# Patient Record
Sex: MALE | Race: AMERICAN INDIAN OR ALASKA NATIVE
[De-identification: names, ages, dates, MRNs, and addresses within clinical notes are randomized per-mention and may not be internally consistent; named-entity substitution may affect disease eponyms.]

---

## 2021-03-05 ENCOUNTER — HOSPITAL ENCOUNTER (EMERGENCY)
Dept: HOSPITAL 5 - ED | Age: 81
Discharge: OTHER INSTITUTION | End: 2021-03-05
Payer: OTHER GOVERNMENT

## 2021-03-05 VITALS — SYSTOLIC BLOOD PRESSURE: 92 MMHG | DIASTOLIC BLOOD PRESSURE: 61 MMHG

## 2021-03-05 DIAGNOSIS — E87.1: ICD-10-CM

## 2021-03-05 DIAGNOSIS — R74.01: ICD-10-CM

## 2021-03-05 DIAGNOSIS — A41.9: Primary | ICD-10-CM

## 2021-03-05 DIAGNOSIS — N17.9: ICD-10-CM

## 2021-03-05 DIAGNOSIS — M86.9: ICD-10-CM

## 2021-03-05 DIAGNOSIS — I10: ICD-10-CM

## 2021-03-05 DIAGNOSIS — E11.9: ICD-10-CM

## 2021-03-05 DIAGNOSIS — E87.2: ICD-10-CM

## 2021-03-05 DIAGNOSIS — R77.8: ICD-10-CM

## 2021-03-05 LAB
ALBUMIN SERPL-MCNC: 2.3 G/DL (ref 3.9–5)
ALT SERPL-CCNC: 227 UNITS/L (ref 7–56)
APTT BLD: 34.6 SEC. (ref 24.2–36.6)
BAND NEUTROPHILS # (MANUAL): 0 K/MM3
BILIRUB UR QL STRIP: (no result)
BLOOD UR QL VISUAL: (no result)
BUN SERPL-MCNC: 126 MG/DL (ref 9–20)
BUN/CREAT SERPL: 19 %
BURR CELLS BLD QL SMEAR: (no result)
CALCIUM SERPL-MCNC: 7 MG/DL (ref 8.4–10.2)
HCT VFR BLD CALC: 38.5 % (ref 35.5–45.6)
HDLC SERPL-MCNC: 22 MG/DL (ref 40–59)
HEMOLYSIS INDEX: 5
HGB BLD-MCNC: 12.7 GM/DL (ref 11.8–15.2)
HYALINE CASTS #/AREA URNS LPF: 1 /LPF
INR PPP: 1.49 (ref 0.87–1.13)
MCHC RBC AUTO-ENTMCNC: 33 % (ref 32–34)
MCV RBC AUTO: 82 FL (ref 84–94)
MYELOCYTES # (MANUAL): 0 K/MM3
PH UR STRIP: 5 [PH] (ref 5–7)
PLATELET # BLD: 74 K/MM3 (ref 140–440)
PROMYELOCYTES # (MANUAL): 0 K/MM3
RBC # BLD AUTO: 4.7 M/MM3 (ref 3.65–5.03)
RBC #/AREA URNS HPF: 2 /HPF (ref 0–6)
TOTAL CELLS COUNTED BLD: 100
UROBILINOGEN UR-MCNC: 2 MG/DL (ref ?–2)
WBC #/AREA URNS HPF: 3 /HPF (ref 0–6)

## 2021-03-05 PROCEDURE — 36415 COLL VENOUS BLD VENIPUNCTURE: CPT

## 2021-03-05 PROCEDURE — 96361 HYDRATE IV INFUSION ADD-ON: CPT

## 2021-03-05 PROCEDURE — 81001 URINALYSIS AUTO W/SCOPE: CPT

## 2021-03-05 PROCEDURE — 96365 THER/PROPH/DIAG IV INF INIT: CPT

## 2021-03-05 PROCEDURE — 80307 DRUG TEST PRSMV CHEM ANLYZR: CPT

## 2021-03-05 PROCEDURE — 93005 ELECTROCARDIOGRAM TRACING: CPT

## 2021-03-05 PROCEDURE — 96367 TX/PROPH/DG ADDL SEQ IV INF: CPT

## 2021-03-05 PROCEDURE — 84443 ASSAY THYROID STIM HORMONE: CPT

## 2021-03-05 PROCEDURE — 87040 BLOOD CULTURE FOR BACTERIA: CPT

## 2021-03-05 PROCEDURE — 82550 ASSAY OF CK (CPK): CPT

## 2021-03-05 PROCEDURE — 80061 LIPID PANEL: CPT

## 2021-03-05 PROCEDURE — 85730 THROMBOPLASTIN TIME PARTIAL: CPT

## 2021-03-05 PROCEDURE — 70450 CT HEAD/BRAIN W/O DYE: CPT

## 2021-03-05 PROCEDURE — 71045 X-RAY EXAM CHEST 1 VIEW: CPT

## 2021-03-05 PROCEDURE — 99291 CRITICAL CARE FIRST HOUR: CPT

## 2021-03-05 PROCEDURE — 96376 TX/PRO/DX INJ SAME DRUG ADON: CPT

## 2021-03-05 PROCEDURE — 85610 PROTHROMBIN TIME: CPT

## 2021-03-05 PROCEDURE — 82805 BLOOD GASES W/O2 SATURATION: CPT

## 2021-03-05 PROCEDURE — 84484 ASSAY OF TROPONIN QUANT: CPT

## 2021-03-05 PROCEDURE — 82962 GLUCOSE BLOOD TEST: CPT

## 2021-03-05 PROCEDURE — 85007 BL SMEAR W/DIFF WBC COUNT: CPT

## 2021-03-05 PROCEDURE — G0480 DRUG TEST DEF 1-7 CLASSES: HCPCS

## 2021-03-05 PROCEDURE — 96375 TX/PRO/DX INJ NEW DRUG ADDON: CPT

## 2021-03-05 PROCEDURE — 73620 X-RAY EXAM OF FOOT: CPT

## 2021-03-05 PROCEDURE — 85025 COMPLETE CBC W/AUTO DIFF WBC: CPT

## 2021-03-05 PROCEDURE — 80053 COMPREHEN METABOLIC PANEL: CPT

## 2021-03-05 PROCEDURE — 87186 SC STD MICRODIL/AGAR DIL: CPT

## 2021-03-05 PROCEDURE — 72125 CT NECK SPINE W/O DYE: CPT

## 2021-03-05 PROCEDURE — 87086 URINE CULTURE/COLONY COUNT: CPT

## 2021-03-05 PROCEDURE — 80320 DRUG SCREEN QUANTALCOHOLS: CPT

## 2021-03-05 PROCEDURE — 87076 CULTURE ANAEROBE IDENT EACH: CPT

## 2021-03-05 PROCEDURE — 82140 ASSAY OF AMMONIA: CPT

## 2021-03-05 NOTE — CAT SCAN REPORT
CT cervical spine wo con, CT head/brain wo con



INDICATION:

AMS, fall.



TECHNIQUE: CT head and cervical spine without contrast. All CT scans at this location are performed u
sing CT dose reduction for ALARA by means of automated exposure control.



COMPARISON: 

None.



FINDINGS:



HEAD:



Intracranial: Gray-white matter differentiation is maintained. No intracranial hemorrhage. No extra a
xial collection.. No hydrocephalus. No herniation.



Sinuses: Paranasal sinuses and mastoid air cells are essentially clear.



Orbits: Globes are intact



Calvarium: No acute fracture.





CERVICAL:



Alignment: 2 to 3 mm degenerative retrolisthesis from C2-C3 to C5-C6. 



Vertebrae: No fracture. Vertebral body heights are preserved. C1 and C2 are congruent.  Atlantooccipi
bobbi joint is maintained.



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  Facet arthropath
y with multilevel severe osseous foraminal narrowing.



Soft tissues: No prevertebral soft tissue thickening.



Additional findings: No significant additional findings.



IMPRESSION:

1.  No acute intracranial abnormality.

2.No cervical spine fracture.



 



Signer Name: Ady Brush MD 

Signed: 3/5/2021 12:46 PM

Workstation Name: [id number]

## 2021-03-05 NOTE — EMERGENCY DEPARTMENT REPORT
HPI





- General


Time Seen by Provider: 03/05/21 09:57





- HPI


HPI: 





This is an 80-year-old -American male who presents to the emergency 

department via EMS from home after a welfare check was done and the patient was 

found to be prone on the floor covered in his own urine.  The patient has a 

history of hypertension, remote prostate cancer, and he had recent left foot 

surgery done by a podiatrist about 1 week ago.  The patient was last seen 

normal/well sometime Wednesday, by a neighbor.  The patient's daughter is 

currently at bedside and says that she also spoke to him on Wednesday.  The 

patient lives alone, but normally is able to take care of all of his ADLs.  The 

patient is sleepy, but easily arousable.  He is oriented to person, place, time,

AAO x3, but still is a poor historian.  He is currently incontinent of urine and

appears to have some left-sided weakness.  The patient was found to have a blood

sugar of 47 with EMS.





ED Review of Systems


ROS: 


Stated complaint: ALTERED MENTAL STATUS


Other details as noted in HPI





Comment: Unobtainable due to pts medical conditions


Constitutional: weakness


Musculoskeletal: arthralgia (Left foot)


Neurological: weakness





Physical Exam





- Physical Exam


Physical Exam: 





GENERAL: Disheveled appearance.  Patient is ill-appearing.


HENT: Normocephalic.  Atraumatic.    Patient has dry mucous membranes.


EYES: Extraocular motions are intact.  Pupils equal reactive to light 

bilaterally.


NECK: Supple. Trachea is midline.


CHEST/LUNGS: Clear to auscultation.  There is no respiratory distress noted.


HEART/CARDIOVASCULAR: Regular.  There is moderate tachycardia.  There is no 

murmur.


ABDOMEN: Abdomen is soft, nontender.  Patient has normal bowel sounds.  There is

no abdominal distention.


SKIN: Skin is warm and dry.  The left foot is wet and macerated.  There is an 

ulcerating wound to the left heel.


NEURO: The patient is awake, alert, and oriented.  The patient is cooperative.  

There is some slurred speech.  Left-sided weakness when compared to right.


MUSCULOSKELETAL: There is no tenderness to palpation.





ED Course





- Reevaluation(s)


Reevaluation #1: 





03/05/21 10:38


The patient is an 8 on the NIH stroke scale.  Last known well time was sometime 

Wednesday, at least 36 hours ago.  Therefore the patient is not a candidate for 

TPA or thrombectomy.





- Consultations


Consultation #1: 


I spoke with the patient's podiatrist, Dr. Alicia Bain.  He says that he is 

able and willing to consult on this patient if he gets admitted to [facility].  The transfer hub is paging the hospitalist service.


03/05/21 13:41





Consultation #2: 





03/05/21 15:05


I have spoken to the hospitalist, Dr. Ordoñez, regarding the patient's presentation 

and ED course.  She felt the patient would be a more appropriately taken care of

in the MICU.  I spoke to the intensivist, Dr. Rios, who has accepted the 

patient for transfer to the [facility].  He has requested that the patient 

receive clindamycin and an antibiotic for anaerobic coverage.





- ABG Interpretation


Ph: 7.349


PCO2: 17


PO2: 122


Bicarbonate: 9.5


Interpretation: respiratory alkalosis, metabolic acidosis





ED Medical Decision Making





- Lab Data


Result diagrams: 


                                 03/05/21 10:57





                                 03/05/21 10:57





                                   Lab Results











  03/05/21 03/05/21 03/05/21 Range/Units





  10:16 10:40 10:57 


 


WBC    23.7 H  (4.5-11.0)  K/mm3


 


RBC    4.70  (3.65-5.03)  M/mm3


 


Hgb    12.7  (11.8-15.2)  gm/dl


 


Hct    38.5  (35.5-45.6)  %


 


MCV    82 L  (84-94)  fl


 


MCH    27 L  (28-32)  pg


 


MCHC    33  (32-34)  %


 


RDW    17.2 H  (13.2-15.2)  %


 


Plt Count    74 L  (140-440)  K/mm3


 


Add Manual Diff    Complete  


 


Total Counted    100  


 


Seg Neuts % (Manual)    90.0 H  (40.0-70.0)  %


 


Lymphocytes % (Manual)    8.0 L  (13.4-35.0)  %


 


Monocytes % (Manual)    2.0  (0.0-7.3)  %


 


Nucleated RBC %    Not Reportable  


 


Seg Neutrophils # Man    21.3 H  (1.8-7.7)  K/mm3


 


Band Neutrophils #    0.0  K/mm3


 


Lymphocytes # (Manual)    1.9  (1.2-5.4)  K/mm3


 


Abs React Lymphs (Man)    0.0  K/mm3


 


Monocytes # (Manual)    0.5  (0.0-0.8)  K/mm3


 


Eosinophils # (Manual)    0.0  (0.0-0.4)  K/mm3


 


Basophils # (Manual)    0.0  (0.0-0.1)  K/mm3


 


Metamyelocytes #    0.0  K/mm3


 


Myelocytes #    0.0  K/mm3


 


Promyelocytes #    0.0  K/mm3


 


Blast Cells #    0.0  K/mm3


 


WBC Morphology    Not Reportable  


 


Hypersegmented Neuts    Not Reportable  


 


Hyposegmented Neuts    Not Reportable  


 


Hypogranular Neuts    Not Reportable  


 


Smudge Cells    Not Reportable  


 


Toxic Granulation    Not Reportable  


 


Toxic Vacuolation    Not Reportable  


 


Dohle Bodies    Not Reportable  


 


Pelger-Huet Anomaly    Not Reportable  


 


Devno Rods    Not Reportable  


 


Platelet Estimate    Consistent w auto  


 


Clumped Platelets    Not Reportable  


 


Plt Clumps, EDTA    Not Reportable  


 


Large Platelets    Not Reportable  


 


Giant Platelets    Not Reportable  


 


Platelet Satelliting    Not Reportable  


 


Plt Morphology Comment    Not Reportable  


 


RBC Morphology    Not Reportable  


 


Dimorphic RBCs    Not Reportable  


 


Polychromasia    Not Reportable  


 


Hypochromasia    Not Reportable  


 


Poikilocytosis    Not Reportable  


 


Anisocytosis    Not Reportable  


 


Microcytosis    Not Reportable  


 


Macrocytosis    Not Reportable  


 


Spherocytes    Not Reportable  


 


Pappenheimer Bodies    Not Reportable  


 


Sickle Cells    Not Reportable  


 


Target Cells    Not Reportable  


 


Tear Drop Cells    Not Reportable  


 


Ovalocytes    Not Reportable  


 


Helmet Cells    Not Reportable  


 


[place] Bodies    Not Reportable  


 


Cabot Rings    Not Reportable  


 


[place] Cells    1+  


 


Bite Cells    Not Reportable  


 


Crenated Cell    Not Reportable  


 


Elliptocytes    Not Reportable  


 


Acanthocytes (Spur)    Rare  


 


Rouleaux    Not Reportable  


 


Hemoglobin C Crystals    Not Reportable  


 


Schistocytes    Rare  


 


Malaria parasites    Not Reportable  


 


Gerson Bodies    Not Reportable  


 


Hem Pathologist Commnt    No  


 


PT     (12.2-14.9)  Sec.


 


INR     (0.87-1.13)  


 


APTT     (24.2-36.6)  Sec.


 


ABG pH     (7.320-7.450)  


 


POC ABG pCO2     (32.0-48.0)  mmHg


 


POC ABG pO2     ()  mmHg


 


POC ABG HCO3     


 


POC ABG Base Excess     


 


ABG Hemoglobin     (12.0-17.5)  


 


ABG Oxyhemoglobin     (94-98)  


 


ABG Methemoglobin     (0.0-1.5)  


 


ABG Sodium     (136.0-145.0)  mmol/L


 


ABG Potassium     (3.40-4.50)  mmol/L


 


ABG Chloride     ()  mmol/L


 


ABG Glucose     (65-95)  mg/dL


 


Carboxyhemoglobin     (0.5-1.5)  


 


FiO2     


 


Sodium     (137-145)  mmol/L


 


Potassium     (3.6-5.0)  mmol/L


 


Chloride     ()  mmol/L


 


Carbon Dioxide     (22-30)  mmol/L


 


Anion Gap     mmol/L


 


BUN     (9-20)  mg/dL


 


Creatinine     (0.8-1.3)  mg/dL


 


Estimated GFR     ml/min


 


BUN/Creatinine Ratio     %


 


Glucose     ()  mg/dL


 


POC Glucose  37 L  < 10 L   ()  mg/dL


 


Lactic Acid     (0.7-2.0)  mmol/L


 


Calcium     (8.4-10.2)  mg/dL


 


Total Bilirubin     (0.1-1.2)  mg/dL


 


AST     (5-40)  units/L


 


ALT     (7-56)  units/L


 


Alkaline Phosphatase     ()  units/L


 


Ammonia     (25-60)  umol/L


 


Total Creatine Kinase     ()  units/L


 


Troponin T     (0.00-0.029)  ng/mL


 


Total Protein     (6.3-8.2)  g/dL


 


Albumin     (3.9-5)  g/dL


 


Albumin/Globulin Ratio     %


 


Triglycerides     (2-149)  mg/dL


 


Cholesterol     ()  mg/dL


 


LDL Cholesterol Direct     ()  mg/dL


 


HDL Cholesterol     (40-59)  mg/dL


 


Cholesterol/HDL Ratio     %


 


TSH     (0.270-4.200)  mlU/mL


 


Arterial Blood Glucose     (65-95)  mg/dL


 


Arterial Blood Ionized Calcium     (4.6-5.3)  mg/dL


 


Urine Color     (Yellow)  


 


Urine Turbidity     (Clear)  


 


Urine pH     (5.0-7.0)  


 


Ur Specific Gravity     (1.003-1.030)  


 


Urine Protein     (Negative)  mg/dL


 


Urine Glucose (UA)     (Negative)  mg/dL


 


Urine Ketones     (Negative)  mg/dL


 


Urine Blood     (Negative)  


 


Urine Nitrite     (Negative)  


 


Urine Bilirubin     (Negative)  


 


Urine Urobilinogen     (<2.0)  mg/dL


 


Ur Leukocyte Esterase     (Negative)  


 


Urine WBC (Auto)     (0.0-6.0)  /HPF


 


Urine RBC (Auto)     (0.0-6.0)  /HPF


 


Hyaline Casts     /LPF


 


Urine Opiates Screen     


 


Urine Methadone Screen     


 


Ur Barbiturates Screen     


 


Ur Phencyclidine Scrn     


 


Ur Amphetamines Screen     


 


U Benzodiazepines Scrn     


 


Urine Cocaine Screen     


 


U Marijuana (THC) Screen     


 


Drugs of Abuse Note     


 


Plasma/Serum Alcohol     (0-0.07)  %














  03/05/21 03/05/21 03/05/21 Range/Units





  10:57 10:57 10:57 


 


WBC     (4.5-11.0)  K/mm3


 


RBC     (3.65-5.03)  M/mm3


 


Hgb     (11.8-15.2)  gm/dl


 


Hct     (35.5-45.6)  %


 


MCV     (84-94)  fl


 


MCH     (28-32)  pg


 


MCHC     (32-34)  %


 


RDW     (13.2-15.2)  %


 


Plt Count     (140-440)  K/mm3


 


Add Manual Diff     


 


Total Counted     


 


Seg Neuts % (Manual)     (40.0-70.0)  %


 


Lymphocytes % (Manual)     (13.4-35.0)  %


 


Monocytes % (Manual)     (0.0-7.3)  %


 


Nucleated RBC %     


 


Seg Neutrophils # Man     (1.8-7.7)  K/mm3


 


Band Neutrophils #     K/mm3


 


Lymphocytes # (Manual)     (1.2-5.4)  K/mm3


 


Abs React Lymphs (Man)     K/mm3


 


Monocytes # (Manual)     (0.0-0.8)  K/mm3


 


Eosinophils # (Manual)     (0.0-0.4)  K/mm3


 


Basophils # (Manual)     (0.0-0.1)  K/mm3


 


Metamyelocytes #     K/mm3


 


Myelocytes #     K/mm3


 


Promyelocytes #     K/mm3


 


Blast Cells #     K/mm3


 


WBC Morphology     


 


Hypersegmented Neuts     


 


Hyposegmented Neuts     


 


Hypogranular Neuts     


 


Smudge Cells     


 


Toxic Granulation     


 


Toxic Vacuolation     


 


Dohle Bodies     


 


Pelger-Huet Anomaly     


 


Devon Rods     


 


Platelet Estimate     


 


Clumped Platelets     


 


Plt Clumps, EDTA     


 


Large Platelets     


 


Giant Platelets     


 


Platelet Satelliting     


 


Plt Morphology Comment     


 


RBC Morphology     


 


Dimorphic RBCs     


 


Polychromasia     


 


Hypochromasia     


 


Poikilocytosis     


 


Anisocytosis     


 


Microcytosis     


 


Macrocytosis     


 


Spherocytes     


 


Pappenheimer Bodies     


 


Sickle Cells     


 


Target Cells     


 


Tear Drop Cells     


 


Ovalocytes     


 


Helmet Cells     


 


[place] Bodies     


 


Cabot Rings     


 


[place] Cells     


 


Bite Cells     


 


Crenated Cell     


 


Elliptocytes     


 


Acanthocytes (Spur)     


 


Rouleaux     


 


Hemoglobin C Crystals     


 


Schistocytes     


 


Malaria parasites     


 


Gerson Bodies     


 


Hem Pathologist Commnt     


 


PT  17.9 H    (12.2-14.9)  Sec.


 


INR  1.49 H    (0.87-1.13)  


 


APTT  34.6    (24.2-36.6)  Sec.


 


ABG pH     (7.320-7.450)  


 


POC ABG pCO2     (32.0-48.0)  mmHg


 


POC ABG pO2     ()  mmHg


 


POC ABG HCO3     


 


POC ABG Base Excess     


 


ABG Hemoglobin     (12.0-17.5)  


 


ABG Oxyhemoglobin     (94-98)  


 


ABG Methemoglobin     (0.0-1.5)  


 


ABG Sodium     (136.0-145.0)  mmol/L


 


ABG Potassium     (3.40-4.50)  mmol/L


 


ABG Chloride     ()  mmol/L


 


ABG Glucose     (65-95)  mg/dL


 


Carboxyhemoglobin     (0.5-1.5)  


 


FiO2     


 


Sodium   127 L   (137-145)  mmol/L


 


Potassium   4.5   (3.6-5.0)  mmol/L


 


Chloride   88.1 L   ()  mmol/L


 


Carbon Dioxide   12 L   (22-30)  mmol/L


 


Anion Gap   31   mmol/L


 


BUN   126 H   (9-20)  mg/dL


 


Creatinine   6.5 H   (0.8-1.3)  mg/dL


 


Estimated GFR   10   ml/min


 


BUN/Creatinine Ratio   19   %


 


Glucose   389 H   ()  mg/dL


 


POC Glucose     ()  mg/dL


 


Lactic Acid    3.70 H*  (0.7-2.0)  mmol/L


 


Calcium   7.0 L   (8.4-10.2)  mg/dL


 


Total Bilirubin   1.90 H   (0.1-1.2)  mg/dL


 


AST   355 H   (5-40)  units/L


 


ALT   227 H   (7-56)  units/L


 


Alkaline Phosphatase   121   ()  units/L


 


Ammonia     (25-60)  umol/L


 


Total Creatine Kinase     ()  units/L


 


Troponin T   0.145 H*   (0.00-0.029)  ng/mL


 


Total Protein   5.1 L   (6.3-8.2)  g/dL


 


Albumin   2.3 L   (3.9-5)  g/dL


 


Albumin/Globulin Ratio   0.8   %


 


Triglycerides   72   (2-149)  mg/dL


 


Cholesterol   58   ()  mg/dL


 


LDL Cholesterol Direct   10 L   ()  mg/dL


 


HDL Cholesterol   22 L   (40-59)  mg/dL


 


Cholesterol/HDL Ratio   2.63   %


 


TSH     (0.270-4.200)  mlU/mL


 


Arterial Blood Glucose     (65-95)  mg/dL


 


Arterial Blood Ionized Calcium     (4.6-5.3)  mg/dL


 


Urine Color     (Yellow)  


 


Urine Turbidity     (Clear)  


 


Urine pH     (5.0-7.0)  


 


Ur Specific Gravity     (1.003-1.030)  


 


Urine Protein     (Negative)  mg/dL


 


Urine Glucose (UA)     (Negative)  mg/dL


 


Urine Ketones     (Negative)  mg/dL


 


Urine Blood     (Negative)  


 


Urine Nitrite     (Negative)  


 


Urine Bilirubin     (Negative)  


 


Urine Urobilinogen     (<2.0)  mg/dL


 


Ur Leukocyte Esterase     (Negative)  


 


Urine WBC (Auto)     (0.0-6.0)  /HPF


 


Urine RBC (Auto)     (0.0-6.0)  /HPF


 


Hyaline Casts     /LPF


 


Urine Opiates Screen     


 


Urine Methadone Screen     


 


Ur Barbiturates Screen     


 


Ur Phencyclidine Scrn     


 


Ur Amphetamines Screen     


 


U Benzodiazepines Scrn     


 


Urine Cocaine Screen     


 


U Marijuana (THC) Screen     


 


Drugs of Abuse Note     


 


Plasma/Serum Alcohol     (0-0.07)  %














  03/05/21 03/05/21 03/05/21 Range/Units





  10:57 10:57 10:57 


 


WBC     (4.5-11.0)  K/mm3


 


RBC     (3.65-5.03)  M/mm3


 


Hgb     (11.8-15.2)  gm/dl


 


Hct     (35.5-45.6)  %


 


MCV     (84-94)  fl


 


MCH     (28-32)  pg


 


MCHC     (32-34)  %


 


RDW     (13.2-15.2)  %


 


Plt Count     (140-440)  K/mm3


 


Add Manual Diff     


 


Total Counted     


 


Seg Neuts % (Manual)     (40.0-70.0)  %


 


Lymphocytes % (Manual)     (13.4-35.0)  %


 


Monocytes % (Manual)     (0.0-7.3)  %


 


Nucleated RBC %     


 


Seg Neutrophils # Man     (1.8-7.7)  K/mm3


 


Band Neutrophils #     K/mm3


 


Lymphocytes # (Manual)     (1.2-5.4)  K/mm3


 


Abs React Lymphs (Man)     K/mm3


 


Monocytes # (Manual)     (0.0-0.8)  K/mm3


 


Eosinophils # (Manual)     (0.0-0.4)  K/mm3


 


Basophils # (Manual)     (0.0-0.1)  K/mm3


 


Metamyelocytes #     K/mm3


 


Myelocytes #     K/mm3


 


Promyelocytes #     K/mm3


 


Blast Cells #     K/mm3


 


WBC Morphology     


 


Hypersegmented Neuts     


 


Hyposegmented Neuts     


 


Hypogranular Neuts     


 


Smudge Cells     


 


Toxic Granulation     


 


Toxic Vacuolation     


 


Dohle Bodies     


 


Pelger-Huet Anomaly     


 


Devon Rods     


 


Platelet Estimate     


 


Clumped Platelets     


 


Plt Clumps, EDTA     


 


Large Platelets     


 


Giant Platelets     


 


Platelet Satelliting     


 


Plt Morphology Comment     


 


RBC Morphology     


 


Dimorphic RBCs     


 


Polychromasia     


 


Hypochromasia     


 


Poikilocytosis     


 


Anisocytosis     


 


Microcytosis     


 


Macrocytosis     


 


Spherocytes     


 


Pappenheimer Bodies     


 


Sickle Cells     


 


Target Cells     


 


Tear Drop Cells     


 


Ovalocytes     


 


Helmet Cells     


 


[place] Bodies     


 


Cabot Rings     


 


[place] Cells     


 


Bite Cells     


 


Crenated Cell     


 


Elliptocytes     


 


Acanthocytes (Spur)     


 


Rouleaux     


 


Hemoglobin C Crystals     


 


Schistocytes     


 


Malaria parasites     


 


Gerson Bodies     


 


Hem Pathologist Commnt     


 


PT     (12.2-14.9)  Sec.


 


INR     (0.87-1.13)  


 


APTT     (24.2-36.6)  Sec.


 


ABG pH     (7.320-7.450)  


 


POC ABG pCO2     (32.0-48.0)  mmHg


 


POC ABG pO2     ()  mmHg


 


POC ABG HCO3     


 


POC ABG Base Excess     


 


ABG Hemoglobin     (12.0-17.5)  


 


ABG Oxyhemoglobin     (94-98)  


 


ABG Methemoglobin     (0.0-1.5)  


 


ABG Sodium     (136.0-145.0)  mmol/L


 


ABG Potassium     (3.40-4.50)  mmol/L


 


ABG Chloride     ()  mmol/L


 


ABG Glucose     (65-95)  mg/dL


 


Carboxyhemoglobin     (0.5-1.5)  


 


FiO2     


 


Sodium     (137-145)  mmol/L


 


Potassium     (3.6-5.0)  mmol/L


 


Chloride     ()  mmol/L


 


Carbon Dioxide     (22-30)  mmol/L


 


Anion Gap     mmol/L


 


BUN     (9-20)  mg/dL


 


Creatinine     (0.8-1.3)  mg/dL


 


Estimated GFR     ml/min


 


BUN/Creatinine Ratio     %


 


Glucose     ()  mg/dL


 


POC Glucose     ()  mg/dL


 


Lactic Acid     (0.7-2.0)  mmol/L


 


Calcium     (8.4-10.2)  mg/dL


 


Total Bilirubin     (0.1-1.2)  mg/dL


 


AST     (5-40)  units/L


 


ALT     (7-56)  units/L


 


Alkaline Phosphatase     ()  units/L


 


Ammonia   23.0 L   (25-60)  umol/L


 


Total Creatine Kinase    3468 H  ()  units/L


 


Troponin T     (0.00-0.029)  ng/mL


 


Total Protein     (6.3-8.2)  g/dL


 


Albumin     (3.9-5)  g/dL


 


Albumin/Globulin Ratio     %


 


Triglycerides     (2-149)  mg/dL


 


Cholesterol     ()  mg/dL


 


LDL Cholesterol Direct     ()  mg/dL


 


HDL Cholesterol     (40-59)  mg/dL


 


Cholesterol/HDL Ratio     %


 


TSH     (0.270-4.200)  mlU/mL


 


Arterial Blood Glucose     (65-95)  mg/dL


 


Arterial Blood Ionized Calcium     (4.6-5.3)  mg/dL


 


Urine Color     (Yellow)  


 


Urine Turbidity     (Clear)  


 


Urine pH     (5.0-7.0)  


 


Ur Specific Gravity     (1.003-1.030)  


 


Urine Protein     (Negative)  mg/dL


 


Urine Glucose (UA)     (Negative)  mg/dL


 


Urine Ketones     (Negative)  mg/dL


 


Urine Blood     (Negative)  


 


Urine Nitrite     (Negative)  


 


Urine Bilirubin     (Negative)  


 


Urine Urobilinogen     (<2.0)  mg/dL


 


Ur Leukocyte Esterase     (Negative)  


 


Urine WBC (Auto)     (0.0-6.0)  /HPF


 


Urine RBC (Auto)     (0.0-6.0)  /HPF


 


Hyaline Casts     /LPF


 


Urine Opiates Screen     


 


Urine Methadone Screen     


 


Ur Barbiturates Screen     


 


Ur Phencyclidine Scrn     


 


Ur Amphetamines Screen     


 


U Benzodiazepines Scrn     


 


Urine Cocaine Screen     


 


U Marijuana (THC) Screen     


 


Drugs of Abuse Note     


 


Plasma/Serum Alcohol  < 0.01    (0-0.07)  %














  03/05/21 03/05/21 03/05/21 Range/Units





  10:57 10:58 11:21 


 


WBC     (4.5-11.0)  K/mm3


 


RBC     (3.65-5.03)  M/mm3


 


Hgb     (11.8-15.2)  gm/dl


 


Hct     (35.5-45.6)  %


 


MCV     (84-94)  fl


 


MCH     (28-32)  pg


 


MCHC     (32-34)  %


 


RDW     (13.2-15.2)  %


 


Plt Count     (140-440)  K/mm3


 


Add Manual Diff     


 


Total Counted     


 


Seg Neuts % (Manual)     (40.0-70.0)  %


 


Lymphocytes % (Manual)     (13.4-35.0)  %


 


Monocytes % (Manual)     (0.0-7.3)  %


 


Nucleated RBC %     


 


Seg Neutrophils # Man     (1.8-7.7)  K/mm3


 


Band Neutrophils #     K/mm3


 


Lymphocytes # (Manual)     (1.2-5.4)  K/mm3


 


Abs React Lymphs (Man)     K/mm3


 


Monocytes # (Manual)     (0.0-0.8)  K/mm3


 


Eosinophils # (Manual)     (0.0-0.4)  K/mm3


 


Basophils # (Manual)     (0.0-0.1)  K/mm3


 


Metamyelocytes #     K/mm3


 


Myelocytes #     K/mm3


 


Promyelocytes #     K/mm3


 


Blast Cells #     K/mm3


 


WBC Morphology     


 


Hypersegmented Neuts     


 


Hyposegmented Neuts     


 


Hypogranular Neuts     


 


Smudge Cells     


 


Toxic Granulation     


 


Toxic Vacuolation     


 


Dohle Bodies     


 


Pelger-Huet Anomaly     


 


Devon Rods     


 


Platelet Estimate     


 


Clumped Platelets     


 


Plt Clumps, EDTA     


 


Large Platelets     


 


Giant Platelets     


 


Platelet Satelliting     


 


Plt Morphology Comment     


 


RBC Morphology     


 


Dimorphic RBCs     


 


Polychromasia     


 


Hypochromasia     


 


Poikilocytosis     


 


Anisocytosis     


 


Microcytosis     


 


Macrocytosis     


 


Spherocytes     


 


Pappenheimer Bodies     


 


Sickle Cells     


 


Target Cells     


 


Tear Drop Cells     


 


Ovalocytes     


 


Helmet Cells     


 


[place] Bodies     


 


Cabot Rings     


 


[place] Cells     


 


Bite Cells     


 


Crenated Cell     


 


Elliptocytes     


 


Acanthocytes (Spur)     


 


Rouleaux     


 


Hemoglobin C Crystals     


 


Schistocytes     


 


Malaria parasites     


 


Gerson Bodies     


 


Hem Pathologist Commnt     


 


PT     (12.2-14.9)  Sec.


 


INR     (0.87-1.13)  


 


APTT     (24.2-36.6)  Sec.


 


ABG pH     (7.320-7.450)  


 


POC ABG pCO2     (32.0-48.0)  mmHg


 


POC ABG pO2     ()  mmHg


 


POC ABG HCO3     


 


POC ABG Base Excess     


 


ABG Hemoglobin     (12.0-17.5)  


 


ABG Oxyhemoglobin     (94-98)  


 


ABG Methemoglobin     (0.0-1.5)  


 


ABG Sodium     (136.0-145.0)  mmol/L


 


ABG Potassium     (3.40-4.50)  mmol/L


 


ABG Chloride     ()  mmol/L


 


ABG Glucose     (65-95)  mg/dL


 


Carboxyhemoglobin     (0.5-1.5)  


 


FiO2     


 


Sodium     (137-145)  mmol/L


 


Potassium     (3.6-5.0)  mmol/L


 


Chloride     ()  mmol/L


 


Carbon Dioxide     (22-30)  mmol/L


 


Anion Gap     mmol/L


 


BUN     (9-20)  mg/dL


 


Creatinine     (0.8-1.3)  mg/dL


 


Estimated GFR     ml/min


 


BUN/Creatinine Ratio     %


 


Glucose     ()  mg/dL


 


POC Glucose   167 H   ()  mg/dL


 


Lactic Acid     (0.7-2.0)  mmol/L


 


Calcium     (8.4-10.2)  mg/dL


 


Total Bilirubin     (0.1-1.2)  mg/dL


 


AST     (5-40)  units/L


 


ALT     (7-56)  units/L


 


Alkaline Phosphatase     ()  units/L


 


Ammonia     (25-60)  umol/L


 


Total Creatine Kinase     ()  units/L


 


Troponin T     (0.00-0.029)  ng/mL


 


Total Protein     (6.3-8.2)  g/dL


 


Albumin     (3.9-5)  g/dL


 


Albumin/Globulin Ratio     %


 


Triglycerides     (2-149)  mg/dL


 


Cholesterol     ()  mg/dL


 


LDL Cholesterol Direct     ()  mg/dL


 


HDL Cholesterol     (40-59)  mg/dL


 


Cholesterol/HDL Ratio     %


 


TSH  2.570    (0.270-4.200)  mlU/mL


 


Arterial Blood Glucose     (65-95)  mg/dL


 


Arterial Blood Ionized Calcium     (4.6-5.3)  mg/dL


 


Urine Color    Yellow  (Yellow)  


 


Urine Turbidity    Slightly-cloudy  (Clear)  


 


Urine pH    5.0  (5.0-7.0)  


 


Ur Specific Gravity    1.013  (1.003-1.030)  


 


Urine Protein    100 mg/dl  (Negative)  mg/dL


 


Urine Glucose (UA)    150  (Negative)  mg/dL


 


Urine Ketones    Neg  (Negative)  mg/dL


 


Urine Blood    Lg  (Negative)  


 


Urine Nitrite    Neg  (Negative)  


 


Urine Bilirubin    Neg  (Negative)  


 


Urine Urobilinogen    2.0  (<2.0)  mg/dL


 


Ur Leukocyte Esterase    Neg  (Negative)  


 


Urine WBC (Auto)    3.0  (0.0-6.0)  /HPF


 


Urine RBC (Auto)    2.0  (0.0-6.0)  /HPF


 


Hyaline Casts    1  /LPF


 


Urine Opiates Screen     


 


Urine Methadone Screen     


 


Ur Barbiturates Screen     


 


Ur Phencyclidine Scrn     


 


Ur Amphetamines Screen     


 


U Benzodiazepines Scrn     


 


Urine Cocaine Screen     


 


U Marijuana (THC) Screen     


 


Drugs of Abuse Note     


 


Plasma/Serum Alcohol     (0-0.07)  %














  03/05/21 03/05/21 03/05/21 Range/Units





  11:21 12:02 12:25 


 


WBC     (4.5-11.0)  K/mm3


 


RBC     (3.65-5.03)  M/mm3


 


Hgb     (11.8-15.2)  gm/dl


 


Hct     (35.5-45.6)  %


 


MCV     (84-94)  fl


 


MCH     (28-32)  pg


 


MCHC     (32-34)  %


 


RDW     (13.2-15.2)  %


 


Plt Count     (140-440)  K/mm3


 


Add Manual Diff     


 


Total Counted     


 


Seg Neuts % (Manual)     (40.0-70.0)  %


 


Lymphocytes % (Manual)     (13.4-35.0)  %


 


Monocytes % (Manual)     (0.0-7.3)  %


 


Nucleated RBC %     


 


Seg Neutrophils # Man     (1.8-7.7)  K/mm3


 


Band Neutrophils #     K/mm3


 


Lymphocytes # (Manual)     (1.2-5.4)  K/mm3


 


Abs React Lymphs (Man)     K/mm3


 


Monocytes # (Manual)     (0.0-0.8)  K/mm3


 


Eosinophils # (Manual)     (0.0-0.4)  K/mm3


 


Basophils # (Manual)     (0.0-0.1)  K/mm3


 


Metamyelocytes #     K/mm3


 


Myelocytes #     K/mm3


 


Promyelocytes #     K/mm3


 


Blast Cells #     K/mm3


 


WBC Morphology     


 


Hypersegmented Neuts     


 


Hyposegmented Neuts     


 


Hypogranular Neuts     


 


Smudge Cells     


 


Toxic Granulation     


 


Toxic Vacuolation     


 


Dohle Bodies     


 


Pelger-Huet Anomaly     


 


Devon Rods     


 


Platelet Estimate     


 


Clumped Platelets     


 


Plt Clumps, EDTA     


 


Large Platelets     


 


Giant Platelets     


 


Platelet Satelliting     


 


Plt Morphology Comment     


 


RBC Morphology     


 


Dimorphic RBCs     


 


Polychromasia     


 


Hypochromasia     


 


Poikilocytosis     


 


Anisocytosis     


 


Microcytosis     


 


Macrocytosis     


 


Spherocytes     


 


Pappenheimer Bodies     


 


Sickle Cells     


 


Target Cells     


 


Tear Drop Cells     


 


Ovalocytes     


 


Helmet Cells     


 


[place] Bodies     


 


Cabot Rings     


 


[place] Cells     


 


Bite Cells     


 


Crenated Cell     


 


Elliptocytes     


 


Acanthocytes (Spur)     


 


Rouleaux     


 


Hemoglobin C Crystals     


 


Schistocytes     


 


Malaria parasites     


 


[place]erson Bodies     


 


Hem Pathologist Commnt     


 


PT     (12.2-14.9)  Sec.


 


INR     (0.87-1.13)  


 


APTT     (24.2-36.6)  Sec.


 


ABG pH     (7.320-7.450)  


 


POC ABG pCO2     (32.0-48.0)  mmHg


 


POC ABG pO2     ()  mmHg


 


POC ABG HCO3     


 


POC ABG Base Excess     


 


ABG Hemoglobin     (12.0-17.5)  


 


ABG Oxyhemoglobin     (94-98)  


 


ABG Methemoglobin     (0.0-1.5)  


 


ABG Sodium     (136.0-145.0)  mmol/L


 


ABG Potassium     (3.40-4.50)  mmol/L


 


ABG Chloride     ()  mmol/L


 


ABG Glucose     (65-95)  mg/dL


 


Carboxyhemoglobin     (0.5-1.5)  


 


FiO2     


 


Sodium     (137-145)  mmol/L


 


Potassium     (3.6-5.0)  mmol/L


 


Chloride     ()  mmol/L


 


Carbon Dioxide     (22-30)  mmol/L


 


Anion Gap     mmol/L


 


BUN     (9-20)  mg/dL


 


Creatinine     (0.8-1.3)  mg/dL


 


Estimated GFR     ml/min


 


BUN/Creatinine Ratio     %


 


Glucose     ()  mg/dL


 


POC Glucose    247 H  ()  mg/dL


 


Lactic Acid   3.80 H*   (0.7-2.0)  mmol/L


 


Calcium     (8.4-10.2)  mg/dL


 


Total Bilirubin     (0.1-1.2)  mg/dL


 


AST     (5-40)  units/L


 


ALT     (7-56)  units/L


 


Alkaline Phosphatase     ()  units/L


 


Ammonia     (25-60)  umol/L


 


Total Creatine Kinase     ()  units/L


 


Troponin T     (0.00-0.029)  ng/mL


 


Total Protein     (6.3-8.2)  g/dL


 


Albumin     (3.9-5)  g/dL


 


Albumin/Globulin Ratio     %


 


Triglycerides     (2-149)  mg/dL


 


Cholesterol     ()  mg/dL


 


LDL Cholesterol Direct     ()  mg/dL


 


HDL Cholesterol     (40-59)  mg/dL


 


Cholesterol/HDL Ratio     %


 


TSH     (0.270-4.200)  mlU/mL


 


Arterial Blood Glucose     (65-95)  mg/dL


 


Arterial Blood Ionized Calcium     (4.6-5.3)  mg/dL


 


Urine Color     (Yellow)  


 


Urine Turbidity     (Clear)  


 


Urine pH     (5.0-7.0)  


 


Ur Specific Gravity     (1.003-1.030)  


 


Urine Protein     (Negative)  mg/dL


 


Urine Glucose (UA)     (Negative)  mg/dL


 


Urine Ketones     (Negative)  mg/dL


 


Urine Blood     (Negative)  


 


Urine Nitrite     (Negative)  


 


Urine Bilirubin     (Negative)  


 


Urine Urobilinogen     (<2.0)  mg/dL


 


Ur Leukocyte Esterase     (Negative)  


 


Urine WBC (Auto)     (0.0-6.0)  /HPF


 


Urine RBC (Auto)     (0.0-6.0)  /HPF


 


Hyaline Casts     /LPF


 


Urine Opiates Screen  Negative    


 


Urine Methadone Screen  Negative    


 


Ur Barbiturates Screen  Negative    


 


Ur Phencyclidine Scrn  Negative    


 


Ur Amphetamines Screen  Negative    


 


U Benzodiazepines Scrn  Negative    


 


Urine Cocaine Screen  Negative    


 


U Marijuana (THC) Screen  Negative    


 


Drugs of Abuse Note  Disclamer    


 


Plasma/Serum Alcohol     (0-0.07)  %














  03/05/21 Range/Units





  13:51 


 


WBC   (4.5-11.0)  K/mm3


 


RBC   (3.65-5.03)  M/mm3


 


Hgb   (11.8-15.2)  gm/dl


 


Hct   (35.5-45.6)  %


 


MCV   (84-94)  fl


 


MCH   (28-32)  pg


 


MCHC   (32-34)  %


 


RDW   (13.2-15.2)  %


 


Plt Count   (140-440)  K/mm3


 


Add Manual Diff   


 


Total Counted   


 


Seg Neuts % (Manual)   (40.0-70.0)  %


 


Lymphocytes % (Manual)   (13.4-35.0)  %


 


Monocytes % (Manual)   (0.0-7.3)  %


 


Nucleated RBC %   


 


Seg Neutrophils # Man   (1.8-7.7)  K/mm3


 


Band Neutrophils #   K/mm3


 


Lymphocytes # (Manual)   (1.2-5.4)  K/mm3


 


Abs React Lymphs (Man)   K/mm3


 


Monocytes # (Manual)   (0.0-0.8)  K/mm3


 


Eosinophils # (Manual)   (0.0-0.4)  K/mm3


 


Basophils # (Manual)   (0.0-0.1)  K/mm3


 


Metamyelocytes #   K/mm3


 


Myelocytes #   K/mm3


 


Promyelocytes #   K/mm3


 


Blast Cells #   K/mm3


 


WBC Morphology   


 


Hypersegmented Neuts   


 


Hyposegmented Neuts   


 


Hypogranular Neuts   


 


Smudge Cells   


 


Toxic Granulation   


 


Toxic Vacuolation   


 


[place] Bodies   


 


Pelger-Huet Anomaly   


 


Devon Rods   


 


Platelet Estimate   


 


Clumped Platelets   


 


Plt Clumps, EDTA   


 


Large Platelets   


 


Giant Platelets   


 


Platelet Satelliting   


 


Plt Morphology Comment   


 


RBC Morphology   


 


Dimorphic RBCs   


 


Polychromasia   


 


Hypochromasia   


 


Poikilocytosis   


 


Anisocytosis   


 


Microcytosis   


 


Macrocytosis   


 


Spherocytes   


 


Pappenheimer Bodies   


 


Sickle Cells   


 


Target Cells   


 


Tear Drop Cells   


 


Ovalocytes   


 


Helmet Cells   


 


[place] Bodies   


 


Cabot Rings   


 


[place] Cells   


 


Bite Cells   


 


Crenated Cell   


 


Elliptocytes   


 


Acanthocytes (Spur)   


 


Rouleaux   


 


Hemoglobin C Crystals   


 


Schistocytes   


 


Malaria parasites   


 


[place] Bodies   


 


Hem Pathologist Commnt   


 


PT   (12.2-14.9)  Sec.


 


INR   (0.87-1.13)  


 


APTT   (24.2-36.6)  Sec.


 


ABG pH  7.349  (7.320-7.450)  


 


POC ABG pCO2  17.7 L  (32.0-48.0)  mmHg


 


POC ABG pO2  127.6 H  ()  mmHg


 


POC ABG HCO3  9.5  


 


POC ABG Base Excess  -13.5  


 


ABG Hemoglobin  13.6  (12.0-17.5)  


 


ABG Oxyhemoglobin  96.8  (94-98)  


 


ABG Methemoglobin  0.3  (0.0-1.5)  


 


ABG Sodium  129.6 L  (136.0-145.0)  mmol/L


 


ABG Potassium  3.9  (3.40-4.50)  mmol/L


 


ABG Chloride  102.0  ()  mmol/L


 


ABG Glucose  230 H  (65-95)  mg/dL


 


Carboxyhemoglobin  1.5  (0.5-1.5)  


 


FiO2  28  


 


Sodium   (137-145)  mmol/L


 


Potassium   (3.6-5.0)  mmol/L


 


Chloride   ()  mmol/L


 


Carbon Dioxide   (22-30)  mmol/L


 


Anion Gap   mmol/L


 


BUN   (9-20)  mg/dL


 


Creatinine   (0.8-1.3)  mg/dL


 


Estimated GFR   ml/min


 


BUN/Creatinine Ratio   %


 


Glucose   ()  mg/dL


 


POC Glucose   ()  mg/dL


 


Lactic Acid   (0.7-2.0)  mmol/L


 


Calcium   (8.4-10.2)  mg/dL


 


Total Bilirubin   (0.1-1.2)  mg/dL


 


AST   (5-40)  units/L


 


ALT   (7-56)  units/L


 


Alkaline Phosphatase   ()  units/L


 


Ammonia   (25-60)  umol/L


 


Total Creatine Kinase   ()  units/L


 


Troponin T   (0.00-0.029)  ng/mL


 


Total Protein   (6.3-8.2)  g/dL


 


Albumin   (3.9-5)  g/dL


 


Albumin/Globulin Ratio   %


 


Triglycerides   (2-149)  mg/dL


 


Cholesterol   ()  mg/dL


 


LDL Cholesterol Direct   ()  mg/dL


 


HDL Cholesterol   (40-59)  mg/dL


 


Cholesterol/HDL Ratio   %


 


TSH   (0.270-4.200)  mlU/mL


 


Arterial Blood Glucose  230 H  (65-95)  mg/dL


 


Arterial Blood Ionized Calcium  3.8 L  (4.6-5.3)  mg/dL


 


Urine Color   (Yellow)  


 


Urine Turbidity   (Clear)  


 


Urine pH   (5.0-7.0)  


 


Ur Specific Gravity   (1.003-1.030)  


 


Urine Protein   (Negative)  mg/dL


 


Urine Glucose (UA)   (Negative)  mg/dL


 


Urine Ketones   (Negative)  mg/dL


 


Urine Blood   (Negative)  


 


Urine Nitrite   (Negative)  


 


Urine Bilirubin   (Negative)  


 


Urine Urobilinogen   (<2.0)  mg/dL


 


Ur Leukocyte Esterase   (Negative)  


 


Urine WBC (Auto)   (0.0-6.0)  /HPF


 


Urine RBC (Auto)   (0.0-6.0)  /HPF


 


Hyaline Casts   /LPF


 


Urine Opiates Screen   


 


Urine Methadone Screen   


 


Ur Barbiturates Screen   


 


Ur Phencyclidine Scrn   


 


Ur Amphetamines Screen   


 


U Benzodiazepines Scrn   


 


Urine Cocaine Screen   


 


U Marijuana (THC) Screen   


 


Drugs of Abuse Note   


 


Plasma/Serum Alcohol   (0-0.07)  %














- EKG Data


-: EKG Interpreted by Me





- EKG Data


When compared to previous EKG there are: previous EKG unavailable


Interpretation: other (Atrial flutter with 2-1 AV block, normal axis, prolonged 

QTC, right bundle branch block)





- Radiology Data


Radiology results: report reviewed, image reviewed


interpreted by me: 





Chest x-ray does not show any acute process.  There are no pleural effusions, 

obvious pneumonia and there is no pneumothorax. No significant cardiomegaly.





CT cervical spine wo con, CT head/brain wo con INDICATION: AMS, fall. TECHNIQUE:

CT head and cervical spine without contrast. All CT scans at this location are 

performed using CT dose reduction for ALARA by means of automated exposure contr

ol. COMPARISON: None. FINDINGS: HEAD: Intracranial: Gray-white matter 

differentiation is maintained. No intracranial hemorrhage. No extra axial 

collection.. No hydrocephalus. No herniation. Sinuses: Paranasal sinuses and 

mastoid air cells are essentially clear. Orbits: Globes are intact Calvarium: No

acute fracture. CERVICAL: Alignment: 2 to 3 mm degenerative retrolisthesis from 

C2-C3 to C5-C6. Vertebrae: No fracture. Vertebral body heights are preserved. C1

and C2 are congruent. Atlantooccipital joint is maintained. Spondylolysis: 

Multilevel spondylosis with moderate spinal canal stenosis at C5-C6. Facet 

arthropathy with multilevel severe osseous foraminal narrowing. Soft tissues: No

prevertebral soft tissue thickening. Additional findings: No significant 

additional findings. IMPRESSION: 1. No acute intracranial abnormality. 2.No 

cervical spine fracture. 





LEFT FOOT 2 VIEW(S) INDICATION / CLINICAL INFORMATION: foot pain, infection 

COMPARISON: None available. FINDINGS: BONES / JOINT(S): No acute fracture. 

Positional deformity of the first through fifth MTP joints. No toñito 

dislocation. Moderate midfoot arthrosis. Diffuse osteopenia. Cortical irregula

rity and sclerosis noted posterior aspect the calcaneus concerning for 

osteomyelitis. SOFT TISSUES: Large soft tissue ulceration over the plantar 

surface of the posterior calcaneus. Soft tissue gas noted throughout the foot 

extending into the lower leg. There is soft tissue swelling and edema. These 

findings are consistent with extensive soft tissue infectious process. 

ADDITIONAL FINDINGS: None. 





- Medical Decision Making





This patient presents to the emergency department after he was found down in his

home, prone, covered in urine.  Patient is awake and able to answer orientation 

questions but he does have some level of confusion and will go right back to 

sleep if not stimulated.  He appears to have some left-sided weakness when 

compared to the right.  Last known well time is sometime Wednesday and therefore

the patient is outside of the window for any TPA or neuro intervention.  CT scan

of the head without contrast did not show any large vessel occlusion, 

hemorrhage, or any other acute process.  CT of the cervical spine did not show 

any fracture, subluxation, or any acute process.





Chest x-ray did not show any pneumonia, pleural effusions, pneumothorax, focal 

consolidation, or any acute process.





The patient's left foot is macerated, edematous and there is an ulcerating wound

to the left heel.  An x-ray was done of the left foot that shows some posterior 

calcaneal osteomyelitis and there is some air versus gas throughout the foot.





The patient's labs show multiple abnormalities.  He has a leukocytosis of 

24,000.  There is acute renal failure with a GFR of 10.  The patient has signs 

of liver failure with severely elevated transaminase levels.  He has a lactic 

acidosis.  There is thrombocytopenia.  The patient is starting to develop 

rhabdomyolysis with a CK of about 3500.





Initially the patient had hypoglycemia and was given 2 different amps of D50.  

His blood sugar then came back at about 300.  It has slowly decreased back down 

to about 120 but the patient has also received multiple liters of IV fluid.





Patient was started on vancomycin and Rocephin for the osteomyelitis.  The 

patient was later given some clindamycin.





The patient requires transfer to another facility as we do not have an 

orthopedist or podiatrist on-call to take care of the osteomyelitis versus gas 

gangrene.





As per the consultation section, I eventually spoke with the MICU intensivist at

[facility] and the patient was accepted for transfer.





It should be noted that the patient did not have any sustained hypoxia.  The 

last set of vitals shows a pulse ox of 83% but I do not believe that was a good 

waveform as the patient was monitored for close to 8 hours and there was no 

signs of respiratory distress or failure. 


Critical Care Time: Yes


Critical care time in (mins) excluding proc time.: 45


Critical care attestation.: 


If time is entered above; I have spent that time in minutes in the direct care 

of this critically ill patient, excluding procedure time.  Critical care time 

was spent on this patient in doing his initial evaluation, multiple 

reevaluations, ordering and interpretation of labs and imaging, IV fluid 

resuscitation, glucose for his hypoglycemic episode, multiple IV antibiotics, 

discussion with the [facility] hospitalist and intensivist, multiple discussions with

the patient and his daughter.





Critical Care Time: 





45 minutes





ED Disposition


Clinical Impression: 


 Hyponatremia, Lactic acidosis, Elevated troponin, Transaminitis





Acute renal failure


Qualifiers:


 Acute renal failure type: unspecified Qualified Code(s): N17.9 - Acute kidney 

failure, unspecified





Sepsis


Qualifiers:


 Sepsis type: sepsis due to unspecified organism Sepsis acute organ dysfunction 

status: with acute organ dysfunction Severe sepsis acute organ dysfunction type:

acute renal failure Acute renal failure type: unspecified Severe sepsis shock 

status: unspecified Qualified Code(s): A41.9 - Sepsis, unspecified organism; 

R65.20 - Severe sepsis without septic shock; N17.9 - Acute kidney failure, 

unspecified





Osteomyelitis


Qualifiers:


 Osteomyelitis type: unspecified type Osteomyelitis location: foot Laterality: 

left Qualified Code(s): M86.9 - Osteomyelitis, unspecified





Disposition: [id number]/TX-[id number] ANOTHER TYPE HLTHCARE


Is pt being admited?: No


Condition: Serious


Time of Disposition: 20:44





- Assessment


Assessment Interval: Baseline





- Level of Consciousness


1a. Level of Consciousness: arousable/minor stimuli





- LOC Questions


1b. LOC Questions: answers both correctly





- LOC Command


1c. LOC Commands: performs tasks correctly





- Best Gaze


2. Best Gaze: normal





- Visual


3. Visual: no visual loss





- Facial Palsy


4. Facial Palsy: normal symmetrical movement





- Motor Arm


5a. Motor Arm Left: no gravity effort


5b. Motor Arm Right: no drift





- Motor Leg


6a. Motor Leg Left: no gravity effort


6b. Motor Leg Right: no drift





- Limb Ataxia


7. Limb Ataxia: absent





- Sensory


8. Sensory: normal





- Best Language


9. Best Language: no aphasia





- Dysarthria


10. Dysarthria: mild/moderate dysarthria





- Extinction and Inattention


11. Extinction/Inattention: no abnormality





- Scoring


Total Score: 8


Stroke Severity: Moderate Stroke

## 2021-03-05 NOTE — XRAY REPORT
LEFT FOOT 2 VIEW(S)



INDICATION / CLINICAL INFORMATION:

foot pain, infection



COMPARISON:

None available.

 

FINDINGS:



BONES / JOINT(S): No acute fracture. Positional deformity of the first through fifth MTP joints. No f
rank dislocation. Moderate midfoot arthrosis. Diffuse osteopenia. Cortical irregularity and sclerosis
 noted posterior aspect the calcaneus concerning for osteomyelitis.



SOFT TISSUES: Large soft tissue ulceration over the plantar surface of the posterior calcaneus. Soft 
tissue gas noted throughout the foot extending into the lower leg. There is soft tissue swelling and 
edema. These findings are consistent with extensive soft tissue infectious process.



ADDITIONAL FINDINGS: None.







Signer Name: Ace Mortensen MD 

Signed: 3/5/2021 11:06 AM

Workstation Name: [email or web address]

## 2021-03-05 NOTE — XRAY REPORT
CHEST 1 VIEW 3/5/2021 10:13 AM



INDICATION / CLINICAL INFORMATION: Altered mental status.



COMPARISON: None available.



FINDINGS:



SUPPORT DEVICES: None.

HEART / MEDIASTINUM: No significant abnormality. 

LUNGS / PLEURA: No confluent infiltrates or pleural effusions. No pneumothorax. 



ADDITIONAL FINDINGS: No significant additional findings.



IMPRESSION:

1. No acute findings.



Signer Name: Ace Mortensen MD 

Signed: 3/5/2021 11:17 AM

Workstation Name: [id number]

## 2021-04-02 ENCOUNTER — OUT OF OFFICE VISIT (OUTPATIENT)
Dept: URBAN - METROPOLITAN AREA MEDICAL CENTER 12 | Facility: MEDICAL CENTER | Age: 81
End: 2021-04-02
Payer: OTHER GOVERNMENT

## 2021-04-02 DIAGNOSIS — R19.5 ABNORMAL FECES: ICD-10-CM

## 2021-04-02 DIAGNOSIS — R19.7 ACUTE DIARRHEA: ICD-10-CM

## 2021-04-02 DIAGNOSIS — R93.2 ABN FIND-BILIARY TRACT: ICD-10-CM

## 2021-04-02 DIAGNOSIS — D64.89 ANEMIA DUE TO OTHER CAUSE: ICD-10-CM

## 2021-04-02 PROCEDURE — 99222 1ST HOSP IP/OBS MODERATE 55: CPT | Performed by: PHYSICIAN ASSISTANT

## 2021-04-02 PROCEDURE — G8427 DOCREV CUR MEDS BY ELIG CLIN: HCPCS | Performed by: PHYSICIAN ASSISTANT

## 2021-04-03 ENCOUNTER — OUT OF OFFICE VISIT (OUTPATIENT)
Dept: URBAN - METROPOLITAN AREA MEDICAL CENTER 12 | Facility: MEDICAL CENTER | Age: 81
End: 2021-04-03
Payer: OTHER GOVERNMENT

## 2021-04-03 DIAGNOSIS — R93.2 ABN FIND-BILIARY TRACT: ICD-10-CM

## 2021-04-03 DIAGNOSIS — D64.89 ANEMIA DUE TO OTHER CAUSE: ICD-10-CM

## 2021-04-03 DIAGNOSIS — R19.5 ABNORMAL FECES: ICD-10-CM

## 2021-04-03 PROCEDURE — 99232 SBSQ HOSP IP/OBS MODERATE 35: CPT | Performed by: INTERNAL MEDICINE
